# Patient Record
Sex: FEMALE | Race: OTHER | NOT HISPANIC OR LATINO | URBAN - METROPOLITAN AREA
[De-identification: names, ages, dates, MRNs, and addresses within clinical notes are randomized per-mention and may not be internally consistent; named-entity substitution may affect disease eponyms.]

---

## 2017-08-16 ENCOUNTER — INPATIENT (INPATIENT)
Facility: HOSPITAL | Age: 37
LOS: 2 days | Discharge: HOME | DRG: 872 | End: 2017-08-19
Attending: STUDENT IN AN ORGANIZED HEALTH CARE EDUCATION/TRAINING PROGRAM | Admitting: STUDENT IN AN ORGANIZED HEALTH CARE EDUCATION/TRAINING PROGRAM
Payer: COMMERCIAL

## 2017-08-16 VITALS
OXYGEN SATURATION: 98 % | RESPIRATION RATE: 18 BRPM | SYSTOLIC BLOOD PRESSURE: 112 MMHG | TEMPERATURE: 100 F | HEART RATE: 118 BPM | DIASTOLIC BLOOD PRESSURE: 74 MMHG

## 2017-08-16 DIAGNOSIS — R63.8 OTHER SYMPTOMS AND SIGNS CONCERNING FOOD AND FLUID INTAKE: ICD-10-CM

## 2017-08-16 DIAGNOSIS — A41.9 SEPSIS, UNSPECIFIED ORGANISM: ICD-10-CM

## 2017-08-16 DIAGNOSIS — E03.9 HYPOTHYROIDISM, UNSPECIFIED: ICD-10-CM

## 2017-08-16 DIAGNOSIS — Z29.9 ENCOUNTER FOR PROPHYLACTIC MEASURES, UNSPECIFIED: ICD-10-CM

## 2017-08-16 LAB
ALBUMIN SERPL ELPH-MCNC: 3.2 G/DL — LOW (ref 3.4–5)
ALP SERPL-CCNC: 76 U/L — SIGNIFICANT CHANGE UP (ref 40–120)
ALT FLD-CCNC: 22 U/L — SIGNIFICANT CHANGE UP (ref 12–42)
ANION GAP SERPL CALC-SCNC: 10 MMOL/L — SIGNIFICANT CHANGE UP (ref 9–16)
APPEARANCE UR: CLEAR — SIGNIFICANT CHANGE UP
APTT BLD: 36.9 SEC — HIGH (ref 27.5–36.5)
AST SERPL-CCNC: 27 U/L — SIGNIFICANT CHANGE UP (ref 15–37)
BASOPHILS NFR BLD AUTO: 0.3 % — SIGNIFICANT CHANGE UP (ref 0–2)
BILIRUB SERPL-MCNC: 0.4 MG/DL — SIGNIFICANT CHANGE UP (ref 0.2–1.2)
BILIRUB UR-MCNC: NEGATIVE — SIGNIFICANT CHANGE UP
BUN SERPL-MCNC: 14 MG/DL — SIGNIFICANT CHANGE UP (ref 7–23)
CALCIUM SERPL-MCNC: 9.9 MG/DL — SIGNIFICANT CHANGE UP (ref 8.5–10.5)
CHLORIDE SERPL-SCNC: 102 MMOL/L — SIGNIFICANT CHANGE UP (ref 96–108)
CO2 SERPL-SCNC: 24 MMOL/L — SIGNIFICANT CHANGE UP (ref 22–31)
COLOR SPEC: YELLOW — SIGNIFICANT CHANGE UP
CREAT SERPL-MCNC: 1.03 MG/DL — SIGNIFICANT CHANGE UP (ref 0.5–1.3)
DIFF PNL FLD: (no result)
EOSINOPHIL NFR BLD AUTO: 0.1 % — SIGNIFICANT CHANGE UP (ref 0–6)
FLUAV SPEC QL CULT: NEGATIVE — SIGNIFICANT CHANGE UP
FLUBV AG SPEC QL IA: NEGATIVE — SIGNIFICANT CHANGE UP
GLUCOSE SERPL-MCNC: 118 MG/DL — HIGH (ref 70–99)
GLUCOSE UR QL: NEGATIVE — SIGNIFICANT CHANGE UP
HCG UR QL: NEGATIVE — SIGNIFICANT CHANGE UP
HCT VFR BLD CALC: 36.2 % — SIGNIFICANT CHANGE UP (ref 34.5–45)
HGB BLD-MCNC: 12.4 G/DL — SIGNIFICANT CHANGE UP (ref 11.5–15.5)
IMM GRANULOCYTES NFR BLD AUTO: 0.3 % — SIGNIFICANT CHANGE UP (ref 0–1.5)
INR BLD: 1.47 — HIGH (ref 0.88–1.16)
KETONES UR-MCNC: (no result) MG/DL
LACTATE SERPL-SCNC: 1.2 MMOL/L — SIGNIFICANT CHANGE UP (ref 0.4–2)
LEUKOCYTE ESTERASE UR-ACNC: (no result)
LYMPHOCYTES # BLD AUTO: 8.4 % — LOW (ref 13–44)
MCHC RBC-ENTMCNC: 29.1 PG — SIGNIFICANT CHANGE UP (ref 27–34)
MCHC RBC-ENTMCNC: 34.3 G/DL — SIGNIFICANT CHANGE UP (ref 32–36)
MCV RBC AUTO: 85 FL — SIGNIFICANT CHANGE UP (ref 80–100)
MONOCYTES NFR BLD AUTO: 9.7 % — SIGNIFICANT CHANGE UP (ref 2–14)
NEUTROPHILS NFR BLD AUTO: 81.2 % — HIGH (ref 43–77)
NITRITE UR-MCNC: NEGATIVE — SIGNIFICANT CHANGE UP
PCO2 BLDV: 33 MMHG — LOW (ref 41–51)
PH BLDV: 7.52 — HIGH (ref 7.32–7.43)
PH UR: 6 — SIGNIFICANT CHANGE UP (ref 5–8)
PLATELET # BLD AUTO: 278 K/UL — SIGNIFICANT CHANGE UP (ref 150–400)
PO2 BLDV: 32 MMHG — LOW (ref 35–40)
POTASSIUM SERPL-MCNC: 4 MMOL/L — SIGNIFICANT CHANGE UP (ref 3.5–5.3)
POTASSIUM SERPL-SCNC: 4 MMOL/L — SIGNIFICANT CHANGE UP (ref 3.5–5.3)
PROT SERPL-MCNC: 8.1 G/DL — SIGNIFICANT CHANGE UP (ref 6.4–8.2)
PROT UR-MCNC: NEGATIVE MG/DL — SIGNIFICANT CHANGE UP
PROTHROM AB SERPL-ACNC: 16.3 SEC — HIGH (ref 9.8–12.7)
RBC # BLD: 4.26 M/UL — SIGNIFICANT CHANGE UP (ref 3.8–5.2)
RBC # FLD: 13.9 % — SIGNIFICANT CHANGE UP (ref 10.3–16.9)
S PYO AG SPEC QL IA: NEGATIVE — SIGNIFICANT CHANGE UP
SAO2 % BLDV: 69 % — SIGNIFICANT CHANGE UP
SODIUM SERPL-SCNC: 136 MMOL/L — SIGNIFICANT CHANGE UP (ref 132–145)
SP GR SPEC: <=1.005 — SIGNIFICANT CHANGE UP (ref 1–1.03)
UROBILINOGEN FLD QL: 0.2 E.U./DL — SIGNIFICANT CHANGE UP
WBC # BLD: 17.3 K/UL — HIGH (ref 3.8–10.5)
WBC # FLD AUTO: 17.3 K/UL — HIGH (ref 3.8–10.5)

## 2017-08-16 PROCEDURE — 99222 1ST HOSP IP/OBS MODERATE 55: CPT | Mod: GC

## 2017-08-16 PROCEDURE — 74177 CT ABD & PELVIS W/CONTRAST: CPT | Mod: 26

## 2017-08-16 PROCEDURE — 99285 EMERGENCY DEPT VISIT HI MDM: CPT | Mod: 25

## 2017-08-16 PROCEDURE — 71020: CPT | Mod: 26

## 2017-08-16 PROCEDURE — 93010 ELECTROCARDIOGRAM REPORT: CPT | Mod: NC

## 2017-08-16 RX ORDER — ACETAMINOPHEN 500 MG
650 TABLET ORAL EVERY 6 HOURS
Qty: 0 | Refills: 0 | Status: DISCONTINUED | OUTPATIENT
Start: 2017-08-16 | End: 2017-08-19

## 2017-08-16 RX ORDER — CEFTRIAXONE 500 MG/1
1 INJECTION, POWDER, FOR SOLUTION INTRAMUSCULAR; INTRAVENOUS EVERY 24 HOURS
Qty: 0 | Refills: 0 | Status: DISCONTINUED | OUTPATIENT
Start: 2017-08-17 | End: 2017-08-17

## 2017-08-16 RX ORDER — AZITHROMYCIN 500 MG/1
500 TABLET, FILM COATED ORAL ONCE
Qty: 0 | Refills: 0 | Status: COMPLETED | OUTPATIENT
Start: 2017-08-16 | End: 2017-08-16

## 2017-08-16 RX ORDER — SODIUM CHLORIDE 9 MG/ML
1000 INJECTION INTRAMUSCULAR; INTRAVENOUS; SUBCUTANEOUS ONCE
Qty: 0 | Refills: 0 | Status: COMPLETED | OUTPATIENT
Start: 2017-08-16 | End: 2017-08-16

## 2017-08-16 RX ORDER — ONDANSETRON 8 MG/1
8 TABLET, FILM COATED ORAL ONCE
Qty: 0 | Refills: 0 | Status: COMPLETED | OUTPATIENT
Start: 2017-08-16 | End: 2017-08-16

## 2017-08-16 RX ORDER — ACETAMINOPHEN 500 MG
650 TABLET ORAL ONCE
Qty: 0 | Refills: 0 | Status: DISCONTINUED | OUTPATIENT
Start: 2017-08-16 | End: 2017-08-16

## 2017-08-16 RX ORDER — ACETAMINOPHEN 500 MG
650 TABLET ORAL ONCE
Qty: 0 | Refills: 0 | Status: COMPLETED | OUTPATIENT
Start: 2017-08-16 | End: 2017-08-16

## 2017-08-16 RX ORDER — CEFTRIAXONE 500 MG/1
1 INJECTION, POWDER, FOR SOLUTION INTRAMUSCULAR; INTRAVENOUS ONCE
Qty: 0 | Refills: 0 | Status: COMPLETED | OUTPATIENT
Start: 2017-08-16 | End: 2017-08-16

## 2017-08-16 RX ORDER — SODIUM CHLORIDE 9 MG/ML
3 INJECTION INTRAMUSCULAR; INTRAVENOUS; SUBCUTANEOUS ONCE
Qty: 0 | Refills: 0 | Status: COMPLETED | OUTPATIENT
Start: 2017-08-16 | End: 2017-08-16

## 2017-08-16 RX ORDER — IBUPROFEN 200 MG
600 TABLET ORAL ONCE
Qty: 0 | Refills: 0 | Status: COMPLETED | OUTPATIENT
Start: 2017-08-16 | End: 2017-08-16

## 2017-08-16 RX ORDER — SODIUM CHLORIDE 9 MG/ML
500 INJECTION INTRAMUSCULAR; INTRAVENOUS; SUBCUTANEOUS
Qty: 0 | Refills: 0 | Status: COMPLETED | OUTPATIENT
Start: 2017-08-16 | End: 2017-08-16

## 2017-08-16 RX ADMIN — SODIUM CHLORIDE 3 MILLILITER(S): 9 INJECTION INTRAMUSCULAR; INTRAVENOUS; SUBCUTANEOUS at 14:57

## 2017-08-16 RX ADMIN — SODIUM CHLORIDE 2000 MILLILITER(S): 9 INJECTION INTRAMUSCULAR; INTRAVENOUS; SUBCUTANEOUS at 15:23

## 2017-08-16 RX ADMIN — SODIUM CHLORIDE 1000 MILLILITER(S): 9 INJECTION INTRAMUSCULAR; INTRAVENOUS; SUBCUTANEOUS at 15:36

## 2017-08-16 RX ADMIN — Medication 600 MILLIGRAM(S): at 15:00

## 2017-08-16 RX ADMIN — CEFTRIAXONE 100 GRAM(S): 500 INJECTION, POWDER, FOR SOLUTION INTRAMUSCULAR; INTRAVENOUS at 15:00

## 2017-08-16 RX ADMIN — AZITHROMYCIN 255 MILLIGRAM(S): 500 TABLET, FILM COATED ORAL at 16:19

## 2017-08-16 RX ADMIN — Medication 650 MILLIGRAM(S): at 16:59

## 2017-08-16 RX ADMIN — SODIUM CHLORIDE 2000 MILLILITER(S): 9 INJECTION INTRAMUSCULAR; INTRAVENOUS; SUBCUTANEOUS at 16:19

## 2017-08-16 RX ADMIN — SODIUM CHLORIDE 2000 MILLILITER(S): 9 INJECTION INTRAMUSCULAR; INTRAVENOUS; SUBCUTANEOUS at 16:00

## 2017-08-16 RX ADMIN — ONDANSETRON 8 MILLIGRAM(S): 8 TABLET, FILM COATED ORAL at 21:28

## 2017-08-16 RX ADMIN — Medication 600 MILLIGRAM(S): at 20:36

## 2017-08-16 RX ADMIN — Medication 600 MILLIGRAM(S): at 16:20

## 2017-08-16 RX ADMIN — SODIUM CHLORIDE 1000 MILLILITER(S): 9 INJECTION INTRAMUSCULAR; INTRAVENOUS; SUBCUTANEOUS at 20:28

## 2017-08-16 RX ADMIN — SODIUM CHLORIDE 2000 MILLILITER(S): 9 INJECTION INTRAMUSCULAR; INTRAVENOUS; SUBCUTANEOUS at 14:57

## 2017-08-16 NOTE — H&P ADULT - NSHPLABSRESULTS_GEN_ALL_CORE
.  LABS:                         12.4   17.3  )-----------( 278      ( 16 Aug 2017 14:52 )             36.2     08-16    136  |  102  |  14  ----------------------------<  118<H>  4.0   |  24  |  1.03    Ca    9.9      16 Aug 2017 14:52    TPro  8.1  /  Alb  3.2<L>  /  TBili  0.4  /  DBili  x   /  AST  27  /  ALT  22  /  AlkPhos  76  08-16    PT/INR - ( 16 Aug 2017 14:52 )   PT: 16.3 sec;   INR: 1.47          PTT - ( 16 Aug 2017 14:52 )  PTT:36.9 sec  Urinalysis Basic - ( 16 Aug 2017 16:23 )    Color: Yellow / Appearance: Clear / SG: <=1.005 / pH: x  Gluc: x / Ketone: Trace mg/dL  / Bili: NEGATIVE / Urobili: 0.2 E.U./dL   Blood: x / Protein: NEGATIVE mg/dL / Nitrite: NEGATIVE   Leuk Esterase: Small / RBC: 5-10 /HPF / WBC 5-10 /HPF   Sq Epi: x / Non Sq Epi: Many /HPF / Bacteria: Moderate /HPF            Lactate, Blood: 1.2 mmoL/L (08-16 @ 14:52)      RADIOLOGY, EKG & ADDITIONAL TESTS: Reviewed. .  LABS:                         12.4   17.3  )-----------( 278      ( 16 Aug 2017 14:52 )             36.2     08-16    136  |  102  |  14  ----------------------------<  118<H>  4.0   |  24  |  1.03    Ca    9.9      16 Aug 2017 14:52    TPro  8.1  /  Alb  3.2<L>  /  TBili  0.4  /  DBili  x   /  AST  27  /  ALT  22  /  AlkPhos  76  08-16    PT/INR - ( 16 Aug 2017 14:52 )   PT: 16.3 sec;   INR: 1.47          PTT - ( 16 Aug 2017 14:52 )  PTT:36.9 sec  Urinalysis Basic - ( 16 Aug 2017 16:23 )    Color: Yellow / Appearance: Clear / SG: <=1.005 / pH: x  Gluc: x / Ketone: Trace mg/dL  / Bili: NEGATIVE / Urobili: 0.2 E.U./dL   Blood: x / Protein: NEGATIVE mg/dL / Nitrite: NEGATIVE   Leuk Esterase: Small / RBC: 5-10 /HPF / WBC 5-10 /HPF   Sq Epi: x / Non Sq Epi: Many /HPF / Bacteria: Moderate /HPF            Lactate, Blood: 1.2 mmoL/L (08-16 @ 14:52)      RADIOLOGY, EKG & ADDITIONAL TESTS: Reviewed.  CT scan - Wedge-shaped hypodensities extending to the cortex of the bilateral   kidneys, findings consistent with acute pyelonephritis.

## 2017-08-16 NOTE — H&P ADULT - ATTENDING COMMENTS
Pt seen and examined at bedside     Agree with HPI, ROS as above. Pt feels improved, is able to drink and eat.     VS, Labs, FH, SH, allergies, medications, imaging reviewed. Agree with physical exam as above.    A/P: 36 Yo F with Pmhx of hypothyroidism presents with CC of fever and back pain x 2 days, had CT scan which showed, pyelonephritis, c/w clinical exam, admitted for IV abx.    **Sepsis 2/2 pyelonephritis  -pt w/ uncomplicated pyelonephritis, as evidenced on radiography and c/w physical exam/history  -C/w ceftriaxone  -Pt can likely be transitioned to PO in AM   -F/u Ucx, Blood cultures    Plan otherwise as outlined above....

## 2017-08-16 NOTE — H&P ADULT - PROBLEM SELECTOR PLAN 5
Regular diet, replete lytes with goal K>4 and Mg>2  Full code  Dispo - pending improvement in PO intake

## 2017-08-16 NOTE — ED ADULT NURSE NOTE - CHIEF COMPLAINT QUOTE
Patient to ED with complaint of Fever X 2 days, generalized malaise X 2 weeks.  Patient on regiment of doxycycline for atypical pneumonia.  States new onset of lower back and head pain with fever.  Patient tachycardic in triage.  Code Sepsis called

## 2017-08-16 NOTE — ED PROVIDER NOTE - MEDICAL DECISION MAKING DETAILS
patient with generalized body aches, fever, chills, rigors, BL flank pain, cough on doxy x 2 doses for suspected pneumonia with low grade fever in ED, tachycardic, will do CXR, check labs, give IV hydration.

## 2017-08-16 NOTE — H&P ADULT - ASSESSMENT
38 Yo F with Pmhx of hypothyroidism presents with CC of fever and back pain x 2 days, had CT scan which showed, pyelonephritis, c/w clinical exam, admitted for IV abx, as was unable to take po abx-

## 2017-08-16 NOTE — ED PROVIDER NOTE - ENMT, MLM
Airway patent, Nasal mucosa clear. Mouth with normal mucosa. Throat has no vesicles, no oropharyngeal exudates and uvula is midline

## 2017-08-16 NOTE — ED PROVIDER NOTE - OBJECTIVE STATEMENT
PMHx hypothyroidism presenetes with fever Tmax 102.6F, throat pain, congestion, bodyaches, mid to lower back pain, and dry cough x 10 days. patient was seen by her PCP yesturday that started her on doxycycline for suspected pneumonia. denies productive cough, chest pain, SOB, dysuria, nausea, vomiting. admits to anorexia. last took tylenol 40 minutes prior to arrive. patient return from a trip to [facility] in early July and has been well until 10 days ago PMHx hypothyroidism presenetes with fever Tmax 102.6F, throat pain, congestion, bodyaches, and dry cough x 10 days. mid/lower back pain started yesturday. patient was seen by her PCP yesturday that started her on doxycycline for suspected pneumonia. denies productive cough, chest pain, SOB, dysuria, nausea, vomiting. admits to anorexia. last took tylenol 40 minutes prior to arrive. patient return from a trip to [facility] in early July and has been well until 10 days ago

## 2017-08-16 NOTE — H&P ADULT - NSHPPHYSICALEXAM_GEN_ALL_CORE
.  VITAL SIGNS:  T(C): 37 (08-16-17 @ 22:38), Max: 38.2 (08-16-17 @ 15:38)  T(F): 98.6 (08-16-17 @ 22:38), Max: 100.7 (08-16-17 @ 15:38)  HR: 74 (08-16-17 @ 22:38) (69 - 118)  BP: 108/69 (08-16-17 @ 22:38) (108/69 - 112/74)  BP(mean): --  RR: 17 (08-16-17 @ 22:38) (17 - 18)  SpO2: 97% (08-16-17 @ 22:38) (97% - 99%)  Wt(kg): --    PHYSICAL EXAM:    Constitutional: WDWN resting comfortably in bed; NAD  Head: NC/AT  Eyes: PERRL, EOMI, clear conjunctiva  ENT: no nasal discharge; uvula midline, no oropharyngeal erythema or exudates; MMM  Neck: supple; no JVD or thyromegaly  Respiratory: CTA B/L; no W/R/R, no retractions  Cardiac: +S1/S2; RRR; no M/R/G; PMI non-displaced  Gastrointestinal: soft, NT/ND; no rebound or guarding; +BSx4  Genitourinary: normal external genitalia  Back: spine midline, no bony tenderness or step-offs; no CVAT B/L  Extremities: WWP, no clubbing or cyanosis; no peripheral edema  Musculoskeletal: NROM x4; no joint swelling, tenderness or erythema  Vascular: 2+ radial, femoral, DP/PT pulses B/L  Dermatologic: skin warm, dry and intact; no rashes, wounds, or scars  Lymphatic: no submandibular or cervical LAD  Neurologic: AAOx3; CNII-XII grossly intact; no focal deficits  Psychiatric: affect and characteristics of appearance, verbalizations, behaviors are appropriate .  VITAL SIGNS:  T(C): 37 (08-16-17 @ 22:38), Max: 38.2 (08-16-17 @ 15:38)  T(F): 98.6 (08-16-17 @ 22:38), Max: 100.7 (08-16-17 @ 15:38)  HR: 74 (08-16-17 @ 22:38) (69 - 118)  BP: 108/69 (08-16-17 @ 22:38) (108/69 - 112/74)  BP(mean): --  RR: 17 (08-16-17 @ 22:38) (17 - 18)  SpO2: 97% (08-16-17 @ 22:38) (97% - 99%)  Wt(kg): --    PHYSICAL EXAM:    Constitutional: WDWN resting comfortably in bed; NAD  Head: NC/AT  Eyes: PERRL, EOMI, Clear conjunctiva  ENT: no nasal discharge; uvula midline, no oropharyngeal erythema or exudates; MMM  Neck: supple; no JVD or thyromegaly  Respiratory: CTA B/L; no W/R/R, no retractions  Cardiac: +S1/S2; RRR; no M/R/G; PMI non-displaced  Gastrointestinal: soft, NT/ND; no rebound or guarding; +BSx4  Genitourinary: normal external genitalia  Back: + CVAT B/L (L>R), no spinal tenderness  Extremities: WWP, no clubbing or cyanosis; no peripheral edema  Musculoskeletal: NROM x4; no joint swelling, tenderness or erythema  Vascular: 2+ radial, femoral, DP/PT pulses B/L  Dermatologic: skin warm, dry and intact; no rashes, wounds, or scars  Lymphatic: no submandibular or cervical LAD  Neurologic: AAOx3; CNII-XII grossly intact; no focal deficits  Psychiatric: affect and characteristics of appearance, verbalizations, behaviors are appropriate

## 2017-08-16 NOTE — ED ADULT NURSE REASSESSMENT NOTE - NS ED NURSE REASSESS COMMENT FT1
Transfer of care received from KHOA Flores. Pt is laying in bed comfortably, in NAD. Vitals WNL. Pt awaiting admit to [facility]. Will continue to monitor.

## 2017-08-16 NOTE — H&P ADULT - HISTORY OF PRESENT ILLNESS
PMHx hypothyroidism presenetes with fever Tmax 102.6F, throat pain, congestion, bodyaches, and dry cough x 10 days. mid/lower back pain started yesturday. patient was seen by her PCP yesturday that started her on doxycycline for suspected pneumonia. denies productive cough, chest pain, SOB, dysuria, nausea, vomiting. admits to anorexia. last took tylenol 40 minutes prior to arrive. patient return from a trip to [facility] in early July and has been well until 10 days ag      In ED vitals were   Vital Signs Last 24 Hrs  T(C): 37 (16 Aug 2017 22:38), Max: 38.2 (16 Aug 2017 15:38)  T(F): 98.6 (16 Aug 2017 22:38), Max: 100.7 (16 Aug 2017 15:38)  HR: 74 (16 Aug 2017 22:38) (69 - 118)  BP: 108/69 (16 Aug 2017 22:38) (108/69 - 112/74)  BP(mean): --  RR: 17 (16 Aug 2017 22:38) (17 - 18)  SpO2: 97% (16 Aug 2017 22:38) (97% - 99%)    She received tylenol, motrin, ceftriaxone, azithromycin, 3 L NS bolus. She also had CT scan. 38 Yo F with Pmhx of hypothyroidism presents with CC of fever and back pain x 2 days. She started experiencing sore throat,  chills since last week, took Robitussin, symptoms improved. She started experiencing back pain, reexperienced fever, gradually got worse, took Tylenol tablets every 4-6 h w/o response. She went to her PCP on Monday, who thought she might have PNA(No c-xray) prescribes abx - doxycycline, her fever got worse, Tmax ~ 102 at home. She came to ED. Her back pain is bilateral, 8/10 when worse, gets better with tylenol, w/o radiation. Also + chills, rigors, body ache. Denies CP, SOB, belly, pain, n/v/d, dysuria, burning, frequency, previous episode of pyelonephritis, uti, hx of STD, vaginal discharge.  Of note, pt. recently came from a trip to [facility], [place] and [facility], where she was feeling well. She also changed her IUD 3 months ago. NKDA. Social drinker, denies drinking or IVDA. Sexually active w one partner for last 3 months.   In ED vitals were   Vital Signs Last 24 Hrs  T(C): 37 (16 Aug 2017 22:38), Max: 38.2 (16 Aug 2017 15:38)  T(F): 98.6 (16 Aug 2017 22:38), Max: 100.7 (16 Aug 2017 15:38)  HR: 74 (16 Aug 2017 22:38) (69 - 118)  BP: 108/69 (16 Aug 2017 22:38) (108/69 - 112/74)  BP(mean): --  RR: 17 (16 Aug 2017 22:38) (17 - 18)  SpO2: 97% (16 Aug 2017 22:38) (97% - 99%)    She received tylenol, motrin, ceftriaxone, azithromycin, 3 L NS bolus. She also had CT scan.

## 2017-08-16 NOTE — ED PROVIDER NOTE - ATTENDING CONTRIBUTION TO CARE
Pt is a 38yo F with a h/o hypothyroidism who p/w fever, sore throat, congestion, and body aches for the past 10 days.  Also reports dry cough.  Yesterday she developed mid-low back pain - bilateral.  Seen by PCP yesterday and started on Doxycycline for a suspected PNA.  +Nausea and anorexia.   ROS: Denies any chest pain, SOB, dysuria, vomiting.   PE - agree with PA exam as above.   A/P - Fever w/u.  Labs, CXR, UA.  CTAP performed given concerning hx of back pain - shows b/l pyelonephritis.  Abx given.  Anti-emetic given with no resolution of nausea.  Pt denies any improvement of sxs so will admit for complicated/bilateral pyelonephritis and nausea.  Pt will need IV hydration, IV antibiotics, and IV anti-emetics prn.

## 2017-08-16 NOTE — H&P ADULT - PROBLEM SELECTOR PLAN 3
low risk SCD usure reason, no on AC, not on any supplement, reports dec in intake of green vegetables  - Outpatient eval for Vit K defn unsure reason, no on AC, not on any supplement, reports dec in intake of green vegetables  - Outpatient eval for Vit K defn

## 2017-08-16 NOTE — ED ADULT TRIAGE NOTE - CHIEF COMPLAINT QUOTE
Patient to ED with complaint of Fever X 2 days, generalized malaise X 2 weeks.  Patient on regiment of doxycycline for atypical pneumonia.  States new onset of lower back and head pain with fever.  Patient tachycardic in triage Patient to ED with complaint of Fever X 2 days, generalized malaise X 2 weeks.  Patient on regiment of doxycycline for atypical pneumonia.  States new onset of lower back and head pain with fever.  Patient tachycardic in triage.  Code Sepsis called

## 2017-08-16 NOTE — H&P ADULT - PROBLEM SELECTOR PLAN 4
Regular diet, replete lytes with goal K>4 and Mg>2  Full code  Dispo - pending improvement in PO intake low risk SCD

## 2017-08-16 NOTE — H&P ADULT - PROBLEM SELECTOR PLAN 1
fever, + white count, left shift, + SIRS with kidney as source on CT scan, No abscess, no obstruction on kidney - uncomplicated(no other risk factor), s/p ceftriaxone and azithro in LHGV.  - c/w IV ceftriaxone, can transition to PO cipro/bactrim on discharge -?tomorrow  - f/u BCX and Urine cx  - Po tylenol prn fever and pain fever, + white count, left shift, + SIRS with kidney as source on CT scan, No abscess, no obstruction on kidney - uncomplicated(no other risk factor), interestingly UA weekly +, also had epithelial cell.  s/p ceftriaxone and azithro in LHGV. I  - c/w IV ceftriaxone, can transition to PO cipro/bactrim on discharge -?tomorrow  - f/u BCX and Urine cx  - Po tylenol prn fever and pain fever, + white count, left shift, + SIRS with kidney as source on CT scan, No abscess, no obstruction on kidney - uncomplicated(no other risk factor), interestingly UA weekly +, also had epithelial cell.  s/p ceftriaxone and azithro in LHGV.  - c/w IV ceftriaxone, can transition to PO cipro/bactrim on discharge -?tomorrow  - f/u BCX and Urine cx  - Po tylenol prn fever and pain

## 2017-08-17 DIAGNOSIS — R79.1 ABNORMAL COAGULATION PROFILE: ICD-10-CM

## 2017-08-17 DIAGNOSIS — N12 TUBULO-INTERSTITIAL NEPHRITIS, NOT SPECIFIED AS ACUTE OR CHRONIC: ICD-10-CM

## 2017-08-17 DIAGNOSIS — E88.09 OTHER DISORDERS OF PLASMA-PROTEIN METABOLISM, NOT ELSEWHERE CLASSIFIED: ICD-10-CM

## 2017-08-17 LAB
-  CANDIDA ALBICANS: SIGNIFICANT CHANGE UP
-  CANDIDA GLABRATA: SIGNIFICANT CHANGE UP
-  CANDIDA KRUSEI: SIGNIFICANT CHANGE UP
-  CANDIDA PARAPSILOSIS: SIGNIFICANT CHANGE UP
-  CANDIDA TROPICALIS: SIGNIFICANT CHANGE UP
-  COAGULASE NEGATIVE STAPHYLOCOCCUS: SIGNIFICANT CHANGE UP
-  K. PNEUMONIAE GROUP: SIGNIFICANT CHANGE UP
-  KPC RESISTANCE GENE: SIGNIFICANT CHANGE UP
-  STREPTOCOCCUS SP. (NOT GRP A, B OR S PNEUMONIAE): SIGNIFICANT CHANGE UP
A BAUMANNII DNA SPEC QL NAA+PROBE: SIGNIFICANT CHANGE UP
ALBUMIN SERPL ELPH-MCNC: 2.9 G/DL — LOW (ref 3.3–5)
ALP SERPL-CCNC: 79 U/L — SIGNIFICANT CHANGE UP (ref 40–120)
ALT FLD-CCNC: 14 U/L — SIGNIFICANT CHANGE UP (ref 10–45)
ANION GAP SERPL CALC-SCNC: 13 MMOL/L — SIGNIFICANT CHANGE UP (ref 5–17)
APPEARANCE UR: CLEAR — SIGNIFICANT CHANGE UP
APTT BLD: 33 SEC — SIGNIFICANT CHANGE UP (ref 27.5–37.4)
AST SERPL-CCNC: 17 U/L — SIGNIFICANT CHANGE UP (ref 10–40)
BACTERIA # UR AUTO: PRESENT /HPF
BASOPHILS NFR BLD AUTO: 0.1 % — SIGNIFICANT CHANGE UP (ref 0–2)
BILIRUB SERPL-MCNC: 0.2 MG/DL — SIGNIFICANT CHANGE UP (ref 0.2–1.2)
BILIRUB UR-MCNC: NEGATIVE — SIGNIFICANT CHANGE UP
BUN SERPL-MCNC: 9 MG/DL — SIGNIFICANT CHANGE UP (ref 7–23)
CALCIUM SERPL-MCNC: 8.6 MG/DL — SIGNIFICANT CHANGE UP (ref 8.4–10.5)
CHLORIDE SERPL-SCNC: 104 MMOL/L — SIGNIFICANT CHANGE UP (ref 96–108)
CO2 SERPL-SCNC: 22 MMOL/L — SIGNIFICANT CHANGE UP (ref 22–31)
COLOR SPEC: YELLOW — SIGNIFICANT CHANGE UP
CREAT SERPL-MCNC: 0.7 MG/DL — SIGNIFICANT CHANGE UP (ref 0.5–1.3)
DIFF PNL FLD: (no result)
E CLOAC COMP DNA BLD POS QL NAA+PROBE: SIGNIFICANT CHANGE UP
E COLI DNA BLD POS QL NAA+NON-PROBE: SIGNIFICANT CHANGE UP
ENTEROCOC DNA BLD POS QL NAA+NON-PROBE: SIGNIFICANT CHANGE UP
ENTEROCOC DNA BLD POS QL NAA+NON-PROBE: SIGNIFICANT CHANGE UP
EOSINOPHIL NFR BLD AUTO: 0.2 % — SIGNIFICANT CHANGE UP (ref 0–6)
EPI CELLS # UR: (no result) /HPF
GLUCOSE SERPL-MCNC: 97 MG/DL — SIGNIFICANT CHANGE UP (ref 70–99)
GLUCOSE UR QL: NEGATIVE — SIGNIFICANT CHANGE UP
GP B STREP DNA BLD POS QL NAA+NON-PROBE: SIGNIFICANT CHANGE UP
GRAM STN FLD: SIGNIFICANT CHANGE UP
HAEM INFLU DNA BLD POS QL NAA+NON-PROBE: SIGNIFICANT CHANGE UP
HBA1C BLD-MCNC: 5.7 % — HIGH (ref 4–5.6)
HCT VFR BLD CALC: 31.7 % — LOW (ref 34.5–45)
HGB BLD-MCNC: 10.6 G/DL — LOW (ref 11.5–15.5)
INR BLD: 1.29 — HIGH (ref 0.88–1.16)
K OXYTOCA DNA BLD POS QL NAA+NON-PROBE: SIGNIFICANT CHANGE UP
KETONES UR-MCNC: NEGATIVE — SIGNIFICANT CHANGE UP
L MONOCYTOG DNA BLD POS QL NAA+NON-PROBE: SIGNIFICANT CHANGE UP
LACTATE SERPL-SCNC: 0.7 MMOL/L — SIGNIFICANT CHANGE UP (ref 0.5–2)
LEUKOCYTE ESTERASE UR-ACNC: (no result)
LYMPHOCYTES # BLD AUTO: 9.6 % — LOW (ref 13–44)
MAGNESIUM SERPL-MCNC: 2.1 MG/DL — SIGNIFICANT CHANGE UP (ref 1.6–2.6)
MCHC RBC-ENTMCNC: 28.4 PG — SIGNIFICANT CHANGE UP (ref 27–34)
MCHC RBC-ENTMCNC: 33.4 G/DL — SIGNIFICANT CHANGE UP (ref 32–36)
MCV RBC AUTO: 85 FL — SIGNIFICANT CHANGE UP (ref 80–100)
METHOD TYPE: SIGNIFICANT CHANGE UP
MONOCYTES NFR BLD AUTO: 10.9 % — SIGNIFICANT CHANGE UP (ref 2–14)
MRSA SPEC QL CULT: SIGNIFICANT CHANGE UP
MSSA DNA SPEC QL NAA+PROBE: SIGNIFICANT CHANGE UP
N MEN ISLT CULT: SIGNIFICANT CHANGE UP
NEUTROPHILS NFR BLD AUTO: 79.2 % — HIGH (ref 43–77)
NITRITE UR-MCNC: NEGATIVE — SIGNIFICANT CHANGE UP
P AERUGINOSA DNA BLD POS NAA+NON-PROBE: SIGNIFICANT CHANGE UP
PH UR: 7 — SIGNIFICANT CHANGE UP (ref 5–8)
PLATELET # BLD AUTO: 284 K/UL — SIGNIFICANT CHANGE UP (ref 150–400)
POTASSIUM SERPL-MCNC: 4.2 MMOL/L — SIGNIFICANT CHANGE UP (ref 3.5–5.3)
POTASSIUM SERPL-SCNC: 4.2 MMOL/L — SIGNIFICANT CHANGE UP (ref 3.5–5.3)
PROT SERPL-MCNC: 6.5 G/DL — SIGNIFICANT CHANGE UP (ref 6–8.3)
PROT UR-MCNC: NEGATIVE MG/DL — SIGNIFICANT CHANGE UP
PROTEUS SP DNA BLD POS QL NAA+NON-PROBE: SIGNIFICANT CHANGE UP
PROTHROM AB SERPL-ACNC: 14.4 SEC — HIGH (ref 9.8–12.7)
RBC # BLD: 3.73 M/UL — LOW (ref 3.8–5.2)
RBC # FLD: 14.5 % — SIGNIFICANT CHANGE UP (ref 10.3–16.9)
RBC CASTS # UR COMP ASSIST: > 10 /HPF
S MARCESCENS DNA BLD POS NAA+NON-PROBE: SIGNIFICANT CHANGE UP
S PNEUM DNA BLD POS QL NAA+NON-PROBE: SIGNIFICANT CHANGE UP
S PYO DNA BLD POS QL NAA+NON-PROBE: SIGNIFICANT CHANGE UP
SODIUM SERPL-SCNC: 139 MMOL/L — SIGNIFICANT CHANGE UP (ref 135–145)
SP GR SPEC: <=1.005 — SIGNIFICANT CHANGE UP (ref 1–1.03)
SPECIMEN SOURCE: SIGNIFICANT CHANGE UP
UROBILINOGEN FLD QL: 0.2 E.U./DL — SIGNIFICANT CHANGE UP
WBC # BLD: 14.7 K/UL — HIGH (ref 3.8–10.5)
WBC # FLD AUTO: 14.7 K/UL — HIGH (ref 3.8–10.5)
WBC UR QL: > 10 /HPF

## 2017-08-17 PROCEDURE — 71010: CPT | Mod: 26

## 2017-08-17 PROCEDURE — 99233 SBSQ HOSP IP/OBS HIGH 50: CPT | Mod: GC

## 2017-08-17 RX ORDER — LEVOTHYROXINE SODIUM 125 MCG
75 TABLET ORAL DAILY
Qty: 0 | Refills: 0 | Status: DISCONTINUED | OUTPATIENT
Start: 2017-08-17 | End: 2017-08-19

## 2017-08-17 RX ORDER — PIPERACILLIN AND TAZOBACTAM 4; .5 G/20ML; G/20ML
3.38 INJECTION, POWDER, LYOPHILIZED, FOR SOLUTION INTRAVENOUS EVERY 6 HOURS
Qty: 0 | Refills: 0 | Status: DISCONTINUED | OUTPATIENT
Start: 2017-08-17 | End: 2017-08-19

## 2017-08-17 RX ORDER — LEVOTHYROXINE SODIUM 125 MCG
1 TABLET ORAL
Qty: 0 | Refills: 0 | COMMUNITY

## 2017-08-17 RX ORDER — SODIUM CHLORIDE 9 MG/ML
1000 INJECTION INTRAMUSCULAR; INTRAVENOUS; SUBCUTANEOUS
Qty: 0 | Refills: 0 | Status: DISCONTINUED | OUTPATIENT
Start: 2017-08-17 | End: 2017-08-18

## 2017-08-17 RX ADMIN — Medication 650 MILLIGRAM(S): at 12:42

## 2017-08-17 RX ADMIN — Medication 650 MILLIGRAM(S): at 19:52

## 2017-08-17 RX ADMIN — Medication 200 MILLIGRAM(S): at 22:22

## 2017-08-17 RX ADMIN — CEFTRIAXONE 100 GRAM(S): 500 INJECTION, POWDER, FOR SOLUTION INTRAMUSCULAR; INTRAVENOUS at 14:16

## 2017-08-17 RX ADMIN — PIPERACILLIN AND TAZOBACTAM 200 GRAM(S): 4; .5 INJECTION, POWDER, LYOPHILIZED, FOR SOLUTION INTRAVENOUS at 20:30

## 2017-08-17 RX ADMIN — SODIUM CHLORIDE 80 MILLILITER(S): 9 INJECTION INTRAMUSCULAR; INTRAVENOUS; SUBCUTANEOUS at 16:03

## 2017-08-17 RX ADMIN — Medication 650 MILLIGRAM(S): at 05:04

## 2017-08-17 RX ADMIN — Medication 75 MICROGRAM(S): at 06:41

## 2017-08-17 NOTE — PROGRESS NOTE ADULT - PROBLEM SELECTOR PLAN 3
- INR 1.47 upon admission. Likely due to sepsis vs previous URI  - Unlikely vitamin K deficiency, patient reports eating one salad per day  - Downtrended to 1.29 this morning. Will monitor and can follow up outpatient

## 2017-08-17 NOTE — CHART NOTE - NSCHARTNOTEFT_GEN_A_CORE
Called to bedside by nurse due to reports of patient being tachypneic. On arrival, patient looked more diaphoretic than she had throughout day, with mildly increased work of breathing and RR 22. Tylenol given due to fever 38.9, P93, 104/66 (baseline for patient), 95% sat RA. Discussed with senior resident, antibiotic coverage broadened to Zosyn, stat CXR ordered. Ordered lactate, will trend if necessary and consider fluids if patient CXR does not demonstrate fluid overload in setting of sepsis. Called to bedside by nurse due to reports of patient being tachypneic. On arrival, patient looked more diaphoretic than she had throughout day, with mildly increased work of breathing and RR 22. Tylenol given due to fever 38.9, P93, 104/66 (baseline for patient), 95% sat RA. Pulmonary exam remarkable for b/l decreased breath sounds at bases. Cardiac exam S1 S2 + , tachycardic, no murmurs, rubs, or gallops appreciated. Discussed with senior resident, antibiotic coverage broadened to Zosyn, stat CXR ordered. Ordered lactate, will trend if necessary and consider fluids if patient CXR does not demonstrate fluid overload in setting of sepsis.

## 2017-08-17 NOTE — DISCHARGE NOTE ADULT - CARE PLAN
Principal Discharge DX:	Pyelonephritis  Secondary Diagnosis:	Hypothyroidism, unspecified type  Secondary Diagnosis:	Elevated INR Principal Discharge DX:	Sepsis due to Escherichia coli  Goal:	Clearance of infection  Instructions for follow-up, activity and diet:	You were found to have bilateral pyelonephritis (kidney infection) on this admission, likely due to a UTI that migrated to your kidneys. E. Coli was found in your bloodstream, likely from the pyelonephritis. You were given antibiotics throughout your admission and discharged home with a prescription, it is important to complete your treatment to ensure that the infection is cleared. If you experience recurrence of fever, flank or back pain, or notice blood in your urine, please proceed to the nearest emergency department.  Secondary Diagnosis:	Hypothyroidism, unspecified type  Goal:	Euthyroid status  Instructions for follow-up, activity and diet:	You have hypothyroidism. Please continue to take your synthroid home medication to keep your thyroid hormone within the normal range.  Secondary Diagnosis:	Elevated INR  Goal:	Outpatient workup  Instructions for follow-up, activity and diet:	You were found to have an elevated marker for anticoagulation on this visit, as your INR high upon admission. This can be due to the infection that you experienced, or can be due to an unknown etiology. It is recommended to have your PT/PTT/INR drawn when you schedule your next appointment with your primary care physician, Dr. Melgar.  Secondary Diagnosis:	Hypoalbuminemia  Goal:	Improve nutritional status  Instructions for follow-up, activity and diet:	You were found to have a decreased albumin level on your admission. This can be due to poor nutritional intake, likely due to your pyelonephritis and sepsis. Please be sure to eat full, balanced meals and follow up with your primary care physician, Dr. Melgar.  Secondary Diagnosis:	Pyelonephritis  Goal:	Understand etiology of infection  Instructions for follow-up, activity and diet:	You were diagnosed with bilateral pyelonephritis on admission. Due to your family history of kidney infections, it may be possible that there is an anatomic abnormality of your urinary tract/bladder that predisposes you to infections. If you continue to have recurrent UTIs or pyelonephritis, you can consider having the cause of these infections worked up with a urologist. Principal Discharge DX:	Sepsis due to Escherichia coli  Goal:	Clearance of infection  Instructions for follow-up, activity and diet:	You were found to have bilateral pyelonephritis (kidney infection) on this admission, likely due to a UTI that migrated to your kidneys. E. Coli was found in your bloodstream, likely from the pyelonephritis. You were given antibiotics throughout your admission and discharged home with a prescription, it is important to complete your treatment to ensure that the infection is cleared. If you experience recurrence of fever, flank or back pain, or notice blood in your urine, please proceed to the nearest emergency department.  -You are being discharged on ciprofloxacin 500 mg twice a day for 11 days.   -Please follow up with your PMD in 1 week.  Secondary Diagnosis:	Hypothyroidism, unspecified type  Goal:	Euthyroid status  Instructions for follow-up, activity and diet:	You have hypothyroidism. Please continue to take your synthroid home medication to keep your thyroid hormone within the normal range.  -Please follow up with your PMD in 1 week.  Secondary Diagnosis:	Elevated INR  Goal:	Outpatient workup  Instructions for follow-up, activity and diet:	You were found to have an elevated marker for anticoagulation on this visit, as your INR high upon admission. This can be due to the infection that you experienced, or can be due to an unknown etiology. It is recommended to have your PT/PTT/INR drawn when you schedule your next appointment with your primary care physician, Dr. Melgar.  -Please follow up with your PMD in 1 week.  Secondary Diagnosis:	Hypoalbuminemia  Goal:	Improve nutritional status  Instructions for follow-up, activity and diet:	You were found to have a decreased albumin level on your admission. This can be due to poor nutritional intake, likely due to your pyelonephritis and sepsis. Please be sure to eat full, balanced meals and follow up with your primary care physician, Dr. Melgar.  -Please follow up with your PMD in 1 week.  Secondary Diagnosis:	Pyelonephritis  Goal:	Understand etiology of infection  Instructions for follow-up, activity and diet:	You were diagnosed with bilateral pyelonephritis on admission. Due to your family history of kidney infections, it may be possible that there is an anatomic abnormality of your urinary tract/bladder that predisposes you to infections. If you continue to have recurrent UTIs or pyelonephritis, you can consider having the cause of these infections worked up with a urologist.  -Please follow up with your PMD in 1 week.

## 2017-08-17 NOTE — DISCHARGE NOTE ADULT - PLAN OF CARE
Clearance of infection You were found to have bilateral pyelonephritis (kidney infection) on this admission, likely due to a UTI that migrated to your kidneys. E. Coli was found in your bloodstream, likely from the pyelonephritis. You were given antibiotics throughout your admission and discharged home with a prescription, it is important to complete your treatment to ensure that the infection is cleared. If you experience recurrence of fever, flank or back pain, or notice blood in your urine, please proceed to the nearest emergency department. Euthyroid status You have hypothyroidism. Please continue to take your synthroid home medication to keep your thyroid hormone within the normal range. Outpatient workup You were found to have an elevated marker for anticoagulation on this visit, as your INR high upon admission. This can be due to the infection that you experienced, or can be due to an unknown etiology. It is recommended to have your PT/PTT/INR drawn when you schedule your next appointment with your primary care physician, Dr. Melgar. Improve nutritional status You were found to have a decreased albumin level on your admission. This can be due to poor nutritional intake, likely due to your pyelonephritis and sepsis. Please be sure to eat full, balanced meals and follow up with your primary care physician, Dr. Melgar. Understand etiology of infection You were diagnosed with bilateral pyelonephritis on admission. Due to your family history of kidney infections, it may be possible that there is an anatomic abnormality of your urinary tract/bladder that predisposes you to infections. If you continue to have recurrent UTIs or pyelonephritis, you can consider having the cause of these infections worked up with a urologist. You were found to have bilateral pyelonephritis (kidney infection) on this admission, likely due to a UTI that migrated to your kidneys. E. Coli was found in your bloodstream, likely from the pyelonephritis. You were given antibiotics throughout your admission and discharged home with a prescription, it is important to complete your treatment to ensure that the infection is cleared. If you experience recurrence of fever, flank or back pain, or notice blood in your urine, please proceed to the nearest emergency department.  -You are being discharged on ciprofloxacin 500 mg twice a day for 11 days.   -Please follow up with your PMD in 1 week. You have hypothyroidism. Please continue to take your synthroid home medication to keep your thyroid hormone within the normal range.  -Please follow up with your PMD in 1 week. You were found to have an elevated marker for anticoagulation on this visit, as your INR high upon admission. This can be due to the infection that you experienced, or can be due to an unknown etiology. It is recommended to have your PT/PTT/INR drawn when you schedule your next appointment with your primary care physician, Dr. Melgar.  -Please follow up with your PMD in 1 week. You were found to have a decreased albumin level on your admission. This can be due to poor nutritional intake, likely due to your pyelonephritis and sepsis. Please be sure to eat full, balanced meals and follow up with your primary care physician, Dr. Melgar.  -Please follow up with your PMD in 1 week. You were diagnosed with bilateral pyelonephritis on admission. Due to your family history of kidney infections, it may be possible that there is an anatomic abnormality of your urinary tract/bladder that predisposes you to infections. If you continue to have recurrent UTIs or pyelonephritis, you can consider having the cause of these infections worked up with a urologist.  -Please follow up with your PMD in 1 week.

## 2017-08-17 NOTE — DISCHARGE NOTE ADULT - PATIENT PORTAL LINK FT
“You can access the [facility] Patient Portal, offered by [facility], by registering with the following website: [email or web address]”

## 2017-08-17 NOTE — PROGRESS NOTE ADULT - SUBJECTIVE AND OBJECTIVE BOX
OVERNIGHT EVENTS: Patient febrile to 39.1, decreased with Tylenol    SUBJECTIVE / INTERVAL HPI: 37F PMH hypothyroidism with fever and back pain of approximately 1 week duration. Patient began by experiencing 10 days of cold symptoms including sore throat and cough with clear-yellow phlegm, which improved with Robitussin. She began to have b/l back pain, 8/10, nonradiating, as well as fever which did not respond to q4-6h Tylenol. Fever was subjective and unmeasured Fri. On Mon, pt visited PCP, who prescribed doxycycline (no CXR), fever was 101F Monday, 102F Tues. Patient began to have nausea as well yesterday, came to ED with symptoms of chills, body ache. At no time was patient with dysuria, increased frequency, or urgency, no complaint of vaginal discharge or foul odor.      In ED, febrile 38.2, P 60s-110s, 100s/70s, R 17, sat 97% RA. Received 2.5L NS, tylenol, motrin, ceftriaxone, azithromycin. WBC 17.3, 81% PMNs, PTT 36.9, INR 1.47, albumin 3.2. VBG 7.52, CO2 33, ?bicarb. UA showed many epithelial cells, small LE, neg nitrite, mod blood, 5-10 WBC, 5-10 RBC. Rapid strep and flu negative. CTAP showed wedge shaped bilateral renal hypodensities c/w pyelonephritis.    Patient seen and examined at bedside. Patient feels febrile this morning, denies nausea, vomiting, diarrhea, dysuria, increased frequency, urgency of urination, chest pain, shortness of breath. Admits to dry cough.     VITAL SIGNS:  Vital Signs Last 24 Hrs  T(C): 37.4 (17 Aug 2017 06:43), Max: 39.1 (17 Aug 2017 04:55)  T(F): 99.4 (17 Aug 2017 06:43), Max: 102.3 (17 Aug 2017 04:55)  HR: 92 (17 Aug 2017 05:50) (69 - 118)  BP: 121/85 (17 Aug 2017 05:50) (108/69 - 121/85)  BP(mean): --  RR: 18 (17 Aug 2017 05:50) (17 - 18)  SpO2: 100% (17 Aug 2017 05:50) (97% - 100%)    PHYSICAL EXAM:    General: WDWN, mildly diaphoretic, no acute distress  HEENT: NC/AT, anicteric sclera  Neck: supple, no appreciable lymphadenopathy in anterior cervical lymph nodes or supraclavicular nodes  Cardiovascular: +S1/S2; no MRG appreciated, tachycardic  Respiratory: CTA B/L; no W/R/R, egophony negative  Gastrointestinal: soft, NT/ND; +BSx4  : CVA tenderness, mild, bilateral, worse R than L  Extremities: WWP; no edema or cyanosis  Neurological: AAOx3; no focal deficits    MEDICATIONS:  MEDICATIONS  (STANDING):  cefTRIAXone   IVPB 1 Gram(s) IV Intermittent every 24 hours  levothyroxine 75 MICROGram(s) Oral daily    MEDICATIONS  (PRN):  acetaminophen   Tablet 650 milliGRAM(s) Oral every 6 hours PRN For Temp greater than 38 C (100.4 F)  acetaminophen   Tablet. 650 milliGRAM(s) Oral every 6 hours PRN Moderate Pain (4 - 6)      ALLERGIES:  Allergies    No Known Allergies    Intolerances        LABS:                        12.4   17.3  )-----------( 278      ( 16 Aug 2017 14:52 )             36.2     08-16    136  |  102  |  14  ----------------------------<  118<H>  4.0   |  24  |  1.03    Ca    9.9      16 Aug 2017 14:52    TPro  8.1  /  Alb  3.2<L>  /  TBili  0.4  /  DBili  x   /  AST  27  /  ALT  22  /  AlkPhos  76  08-16    PT/INR - ( 16 Aug 2017 14:52 )   PT: 16.3 sec;   INR: 1.47          PTT - ( 16 Aug 2017 14:52 )  PTT:36.9 sec  Urinalysis Basic - ( 16 Aug 2017 16:23 )    Color: Yellow / Appearance: Clear / SG: <=1.005 / pH: x  Gluc: x / Ketone: Trace mg/dL  / Bili: NEGATIVE / Urobili: 0.2 E.U./dL   Blood: x / Protein: NEGATIVE mg/dL / Nitrite: NEGATIVE   Leuk Esterase: Small / RBC: 5-10 /HPF / WBC 5-10 /HPF   Sq Epi: x / Non Sq Epi: Many /HPF / Bacteria: Moderate /HPF      CAPILLARY BLOOD GLUCOSE          RADIOLOGY & ADDITIONAL TESTS: Reviewed. OVERNIGHT EVENTS: Patient febrile to 39.1, decreased with Tylenol    SUBJECTIVE / INTERVAL HPI: 37F PMH hypothyroidism with fever and back pain of approximately 1 week duration. Patient began by experiencing 10 days of cold symptoms including sore throat and cough with clear-yellow phlegm, which improved with Robitussin. She began to have b/l back pain, 8/10, nonradiating, as well as fever which did not respond to q4-6h Tylenol. Fever was subjective and unmeasured Fri. On Mon, pt visited PCP, who prescribed doxycycline (no CXR), fever was 101F Monday, 102F Tues. Patient began to have nausea as well yesterday, came to ED with symptoms of chills, body ache. At no time was patient with dysuria, increased frequency, or urgency, no complaint of vaginal discharge or foul odor.      In ED, febrile 38.2, P 60s-110s, 100s/70s, R 17, sat 97% RA. Received 2.5L NS, tylenol, motrin, ceftriaxone, azithromycin. WBC 17.3, 81% PMNs, PTT 36.9, INR 1.47, albumin 3.2. VBG 7.52, CO2 33, ?bicarb. UA showed many epithelial cells, small LE, neg nitrite, mod blood, 5-10 WBC, 5-10 RBC. Rapid strep and flu negative. CTAP showed wedge shaped bilateral renal hypodensities c/w pyelonephritis.    Patient seen and examined at bedside. Patient feels febrile this morning, denies nausea, vomiting, diarrhea, dysuria, increased frequency, urgency of urination, chest pain, shortness of breath. Admits to dry cough and generalized headache this morning.     VITAL SIGNS:  Vital Signs Last 24 Hrs  T(C): 37.4 (17 Aug 2017 06:43), Max: 39.1 (17 Aug 2017 04:55)  T(F): 99.4 (17 Aug 2017 06:43), Max: 102.3 (17 Aug 2017 04:55)  HR: 92 (17 Aug 2017 05:50) (69 - 118)  BP: 121/85 (17 Aug 2017 05:50) (108/69 - 121/85)  BP(mean): --  RR: 18 (17 Aug 2017 05:50) (17 - 18)  SpO2: 100% (17 Aug 2017 05:50) (97% - 100%)    PHYSICAL EXAM:    General: WDWN, mildly diaphoretic, no acute distress  HEENT: NC/AT, anicteric sclera, no oropharyngeal exudate, freckling at roof of mouth  Neck: supple, no appreciable lymphadenopathy in anterior cervical lymph nodes or supraclavicular nodes  Cardiovascular: +S1/S2; no MRG appreciated, tachycardic  Respiratory: CTA B/L; no W/R/R, egophony negative  Gastrointestinal: soft, NT/ND; +BSx4  : CVA tenderness, mild, bilateral, worse R than L  Extremities: WWP; no edema or cyanosis  Neurological: AAOx3; no focal deficits    MEDICATIONS:  MEDICATIONS  (STANDING):  cefTRIAXone   IVPB 1 Gram(s) IV Intermittent every 24 hours  levothyroxine 75 MICROGram(s) Oral daily    MEDICATIONS  (PRN):  acetaminophen   Tablet 650 milliGRAM(s) Oral every 6 hours PRN For Temp greater than 38 C (100.4 F)  acetaminophen   Tablet. 650 milliGRAM(s) Oral every 6 hours PRN Moderate Pain (4 - 6)      ALLERGIES:  Allergies    No Known Allergies    Intolerances        LABS:                        12.4   17.3  )-----------( 278      ( 16 Aug 2017 14:52 )             36.2     08-16    136  |  102  |  14  ----------------------------<  118<H>  4.0   |  24  |  1.03    Ca    9.9      16 Aug 2017 14:52    TPro  8.1  /  Alb  3.2<L>  /  TBili  0.4  /  DBili  x   /  AST  27  /  ALT  22  /  AlkPhos  76  08-16    PT/INR - ( 16 Aug 2017 14:52 )   PT: 16.3 sec;   INR: 1.47          PTT - ( 16 Aug 2017 14:52 )  PTT:36.9 sec  Urinalysis Basic - ( 16 Aug 2017 16:23 )    Color: Yellow / Appearance: Clear / SG: <=1.005 / pH: x  Gluc: x / Ketone: Trace mg/dL  / Bili: NEGATIVE / Urobili: 0.2 E.U./dL   Blood: x / Protein: NEGATIVE mg/dL / Nitrite: NEGATIVE   Leuk Esterase: Small / RBC: 5-10 /HPF / WBC 5-10 /HPF   Sq Epi: x / Non Sq Epi: Many /HPF / Bacteria: Moderate /HPF      CAPILLARY BLOOD GLUCOSE          RADIOLOGY & ADDITIONAL TESTS: Reviewed.

## 2017-08-17 NOTE — PROGRESS NOTE ADULT - ASSESSMENT
37F with past medical history hypothyroidism presented with 1 week fever and back pain. Patient came with elevated WBC, was placed on ceftriaxone IV and found by CTAP to have bilateral pyelonephritis. Patient febrile to 39.1 this morning.

## 2017-08-17 NOTE — PROGRESS NOTE ADULT - PROBLEM SELECTOR PLAN 5
- No fluids at present, PO intake okay per pt. Will consider fluids if PO intake decreases or patient shows signs of dehydration on BMP  - Replete electrolytes PRN  - Regular diet  - No SQH (IMPROVE score 0)  - Dispo RMF    Code FULL

## 2017-08-17 NOTE — DISCHARGE NOTE ADULT - HOSPITAL COURSE
36 Yo F with Pmhx of hypothyroidism presents with CC of fever and back pain x 2 days, found to have pyelonephritis, on CT scan. She received IV antibiotics ceftriaxone. She got clinically better. .  She would go home on _________ as culture result showed __________. she was also found to have elevated INR and she would f/u with PCP _________. She is HD stable for discharge 37F with PMH hypothyroidism presented with 6 days fever and bilateral back pain, found to have bilateral pyelonephritis on CT scan. VBG demonstrated pH 7.52, with CO2 33. Lactate was 1.2. Patient was given 2.5L NS bolus in ED. UA was weakly positive for UTI with 5-10 WBC, small leuk esterase, blood and urine cultures sent. Rapid viral panel and flu screening were found to be negative. Patient found to be leukocytotic to 17.3, with elevated INR 1.47, albumin 3.2 (downtrended to 2.9 following fluid administration). She received IV ceftriaxone. Patient continued to experience fevers to 102 throughout her first day of ceftriaxone, and continued to experience mild-moderate dyspnea after her second dose of antibiotics. She was given a stat chest xray and switched to Zosyn. Blood cultures grew E Coli, and patient had clinical improvement following antibiotic escalation. 37F with PMH hypothyroidism presented with 6 days fever and bilateral back pain, found to have bilateral pyelonephritis on CT scan. VBG demonstrated pH 7.52, with CO2 33. Lactate was 1.2. Patient was given 2.5L NS bolus in ED. UA was weakly positive for UTI with 5-10 WBC, small leuk esterase, blood and urine cultures sent. Rapid viral panel and flu screening were found to be negative. Patient found to be leukocytotic to 17.3, with elevated INR 1.47, albumin 3.2 (downtrended to 2.9 following fluid administration). She received IV ceftriaxone. Patient continued to experience fevers to 102 throughout her first day of ceftriaxone, and continued to experience mild-moderate dyspnea after her second dose of antibiotics. She was given a stat chest xray and switched to Zosyn. Blood cultures grew E Coli, and patient had clinical improvement following antibiotic escalation. Blood culture sensitivities show sensitivity to ciprofloxacin.   Patient has been HD stable overnight and is being discharged on PO antibiotics. 37F with PMH hypothyroidism presented with 6 days fever and bilateral back pain, found to have bilateral pyelonephritis on CT scan. VBG demonstrated pH 7.52, with CO2 33. Lactate was 1.2. Patient was given 2.5L NS bolus in ED. UA was weakly positive for UTI with 5-10 WBC, small leuk esterase, blood and urine cultures sent. Rapid viral panel and flu screening were found to be negative. Patient found to be leukocytotic to 17.3, with elevated INR 1.47, albumin 3.2 (downtrended to 2.9 following fluid administration). She received IV ceftriaxone. Patient continued to experience fevers to 102 throughout her first day of ceftriaxone, and continued to experience mild-moderate dyspnea after her second dose of antibiotics. She was given a stat chest xray and switched to Zosyn. Blood cultures grew E Coli, and patient had clinical improvement following antibiotic escalation.    Patient has been HD stable overnight and is being discharged on PO antibiotics. 37F with PMH hypothyroidism presented with 6 days fever and bilateral back pain. in ED UA was weakly positive for UTI with 5-10 WBC, small leuk esterase';  found to have bilateral pyelonephritis on CT scan. Lactate was 1.2. Patient was given 2.5L NS bolus in ED.  blood cxs (+) ecoli.  urine cultures were negative.  Rapid viral panel and flu screening were found to be negative.   Patient found to be leukocytotic to 17.3, with elevated INR 1.47. She received IV ceftriaxone. Patient continued to experience fevers to 102 throughout her first day of ceftriaxone, and continued to experience mild-moderate dyspnea after her second dose of antibiotics. She was given a stat chest xray which revealed a questionable small rt pleural effusion. pt was switched to Zosyn. Blood cultures grew E coli, and patient had clinical improvement by hosp day 3. Patient has been HD stable , with resolution of back pain, afebrile x 24hrs and tolerating POs.  discharged on PO antibiotics.

## 2017-08-17 NOTE — PROGRESS NOTE ADULT - ATTENDING COMMENTS
pt noted to have GNR (+) blood cxs  c/w ceftriaxone  unfortunately it appears urine culture was NOT collected /sent in ED  will send ucx now  f/u speciation of blood cx  rpt blood cxs

## 2017-08-17 NOTE — PROGRESS NOTE ADULT - PROBLEM SELECTOR PLAN 1
- Sepsis likely pyelonephritis due to CTAP findings wedge shaped hypodensities, bilateral kidneys  - WBC 17.3 on admission, downtrended to 14.7 this morning. Trend CBC  - Continue IV ceftriaxone 1g qd, will receive dose this afternoon and consider PO ciprofloxacin 500mg BID x 7d course following IV abx  - Lactate WNL  - UA weakly positive, however, squamous epithelial cells present  - Febrile to 39.1 this morning, Tylenol PRN. Currently 37.1 - Sepsis likely pyelonephritis due to CTAP findings wedge shaped hypodensities, bilateral kidneys  - WBC 17.3 on admission, downtrended to 14.7 this morning. Trend CBC  - Continue IV ceftriaxone 1g qd, will receive dose this afternoon    - Lactate WNL  - UA weakly positive, however, squamous epithelial cells present  - Febrile to 39.1 this morning, Tylenol PRN. Currently 37.1  -f/u ucx

## 2017-08-17 NOTE — DISCHARGE NOTE ADULT - MEDICATION SUMMARY - MEDICATIONS TO TAKE
I will START or STAY ON the medications listed below when I get home from the hospital:    ciprofloxacin 500 mg oral tablet  -- 1 tab(s) by mouth every 12 hours  -- Indication: For Pyelonephritis    Synthroid 75 mcg (0.075 mg) oral tablet  -- 1 tab(s) by mouth once a day  -- Indication: For Hypothyroidism I will START or STAY ON the medications listed below when I get home from the hospital:    levoFLOXacin 750 mg oral tablet  -- 1 tab(s) by mouth every 24 hours  -- Avoid prolonged or excessive exposure to direct and/or artificial sunlight while taking this medication.  Do not take dairy products, antacids, or iron preparations within one hour of this medication.  Finish all this medication unless otherwise directed by prescriber.  May cause drowsiness or dizziness.  Medication should be taken with plenty of water.    -- Indication: For Ecoli infection     Synthroid 75 mcg (0.075 mg) oral tablet  -- 1 tab(s) by mouth once a day  -- Indication: For Hypothyroidism

## 2017-08-18 DIAGNOSIS — A41.51 SEPSIS DUE TO ESCHERICHIA COLI [E. COLI]: ICD-10-CM

## 2017-08-18 DIAGNOSIS — J90 PLEURAL EFFUSION, NOT ELSEWHERE CLASSIFIED: ICD-10-CM

## 2017-08-18 LAB
ANION GAP SERPL CALC-SCNC: 12 MMOL/L — SIGNIFICANT CHANGE UP (ref 5–17)
APTT BLD: 29.8 SEC — SIGNIFICANT CHANGE UP (ref 27.5–37.4)
BUN SERPL-MCNC: 8 MG/DL — SIGNIFICANT CHANGE UP (ref 7–23)
CALCIUM SERPL-MCNC: 8.6 MG/DL — SIGNIFICANT CHANGE UP (ref 8.4–10.5)
CHLORIDE SERPL-SCNC: 105 MMOL/L — SIGNIFICANT CHANGE UP (ref 96–108)
CO2 SERPL-SCNC: 24 MMOL/L — SIGNIFICANT CHANGE UP (ref 22–31)
CREAT SERPL-MCNC: 0.9 MG/DL — SIGNIFICANT CHANGE UP (ref 0.5–1.3)
CULTURE RESULTS: NO GROWTH — SIGNIFICANT CHANGE UP
GLUCOSE SERPL-MCNC: 88 MG/DL — SIGNIFICANT CHANGE UP (ref 70–99)
GRAM STN FLD: SIGNIFICANT CHANGE UP
HCT VFR BLD CALC: 32.5 % — LOW (ref 34.5–45)
HGB BLD-MCNC: 10.7 G/DL — LOW (ref 11.5–15.5)
INR BLD: 1.26 — HIGH (ref 0.88–1.16)
MAGNESIUM SERPL-MCNC: 2.1 MG/DL — SIGNIFICANT CHANGE UP (ref 1.6–2.6)
MCHC RBC-ENTMCNC: 27.8 PG — SIGNIFICANT CHANGE UP (ref 27–34)
MCHC RBC-ENTMCNC: 32.9 G/DL — SIGNIFICANT CHANGE UP (ref 32–36)
MCV RBC AUTO: 84.4 FL — SIGNIFICANT CHANGE UP (ref 80–100)
PHOSPHATE SERPL-MCNC: 4.5 MG/DL — SIGNIFICANT CHANGE UP (ref 2.5–4.5)
PLATELET # BLD AUTO: 300 K/UL — SIGNIFICANT CHANGE UP (ref 150–400)
POTASSIUM SERPL-MCNC: 4 MMOL/L — SIGNIFICANT CHANGE UP (ref 3.5–5.3)
POTASSIUM SERPL-SCNC: 4 MMOL/L — SIGNIFICANT CHANGE UP (ref 3.5–5.3)
PROTHROM AB SERPL-ACNC: 14 SEC — HIGH (ref 9.8–12.7)
RBC # BLD: 3.85 M/UL — SIGNIFICANT CHANGE UP (ref 3.8–5.2)
RBC # FLD: 15.1 % — SIGNIFICANT CHANGE UP (ref 10.3–16.9)
SODIUM SERPL-SCNC: 141 MMOL/L — SIGNIFICANT CHANGE UP (ref 135–145)
SPECIMEN SOURCE: SIGNIFICANT CHANGE UP
WBC # BLD: 10 K/UL — SIGNIFICANT CHANGE UP (ref 3.8–10.5)
WBC # FLD AUTO: 10 K/UL — SIGNIFICANT CHANGE UP (ref 3.8–10.5)

## 2017-08-18 PROCEDURE — 99233 SBSQ HOSP IP/OBS HIGH 50: CPT

## 2017-08-18 PROCEDURE — 71010: CPT | Mod: 26

## 2017-08-18 RX ADMIN — PIPERACILLIN AND TAZOBACTAM 200 GRAM(S): 4; .5 INJECTION, POWDER, LYOPHILIZED, FOR SOLUTION INTRAVENOUS at 11:26

## 2017-08-18 RX ADMIN — Medication 650 MILLIGRAM(S): at 18:08

## 2017-08-18 RX ADMIN — Medication 650 MILLIGRAM(S): at 06:23

## 2017-08-18 RX ADMIN — Medication 200 MILLIGRAM(S): at 06:32

## 2017-08-18 RX ADMIN — Medication 75 MICROGRAM(S): at 06:22

## 2017-08-18 RX ADMIN — SODIUM CHLORIDE 80 MILLILITER(S): 9 INJECTION INTRAMUSCULAR; INTRAVENOUS; SUBCUTANEOUS at 00:28

## 2017-08-18 RX ADMIN — PIPERACILLIN AND TAZOBACTAM 200 GRAM(S): 4; .5 INJECTION, POWDER, LYOPHILIZED, FOR SOLUTION INTRAVENOUS at 23:16

## 2017-08-18 RX ADMIN — PIPERACILLIN AND TAZOBACTAM 200 GRAM(S): 4; .5 INJECTION, POWDER, LYOPHILIZED, FOR SOLUTION INTRAVENOUS at 06:31

## 2017-08-18 RX ADMIN — PIPERACILLIN AND TAZOBACTAM 200 GRAM(S): 4; .5 INJECTION, POWDER, LYOPHILIZED, FOR SOLUTION INTRAVENOUS at 17:51

## 2017-08-18 RX ADMIN — Medication 200 MILLIGRAM(S): at 21:20

## 2017-08-18 RX ADMIN — PIPERACILLIN AND TAZOBACTAM 200 GRAM(S): 4; .5 INJECTION, POWDER, LYOPHILIZED, FOR SOLUTION INTRAVENOUS at 00:28

## 2017-08-18 NOTE — PROGRESS NOTE ADULT - PROBLEM SELECTOR PLAN 5
- IVF NS 80cc/hr, PO intake okay per pt. Will consider holding fluids if patient has signs of overload or pleural effusions worsen  - Replete electrolytes PRN  - Regular diet  - No SQH (IMPROVE score 0)  - Dispo RMF    Code FULL - INR 1.47 upon admission. Likely due to sepsis    - Unlikely vitamin K deficiency, patient reports eating one salad per day  - No family history or personal history of easy bleeding/bruising  - Downtrended to 1.26 this morning. Will monitor and can follow up outpatient

## 2017-08-18 NOTE — PROGRESS NOTE ADULT - ASSESSMENT
37F with PMH hypothyroidism presented with 1 week fever and back pain, found to have leukocytosis and evidence of likely bilateral pyelonephritis on CTAP. Patient having intermittent fevers to 39, antibiotic coverage expanded from ceftriaxone IV to Zosyn last night. Blood culture growing GNR in aerobic bottle.  Found to have new pleural effusions b/l on CXR last night.

## 2017-08-18 NOTE — PROGRESS NOTE ADULT - PROBLEM SELECTOR PLAN 2
- No symptoms at present, continue home dose Synthroid 75mcg qd due to pyelonephritis  clinically improved  abx as above

## 2017-08-18 NOTE — PROGRESS NOTE ADULT - PROBLEM SELECTOR PLAN 6
- 3.2 on admission, 2.9 yesterday morning  - Likely dilutional due to 2.5L fluid bolus in combination with decreased PO intake for past week, will monitor

## 2017-08-18 NOTE — PROGRESS NOTE ADULT - PROBLEM SELECTOR PLAN 7
- IVF NS 80cc/hr, PO intake okay per pt. Will consider holding fluids if patient has signs of overload or pleural effusions worsen  - Replete electrolytes PRN  - Regular diet  - No SQH (IMPROVE score 0) , SCDs while in bed. pt is ambulatory.   - Dispo RMF    Code FULL

## 2017-08-18 NOTE — PROGRESS NOTE ADULT - PROBLEM SELECTOR PLAN 4
- 3.2 on admission, 2.9 yesterday morning  - Likely dilutional due to 2.5L fluid bolus in combination with decreased PO intake for past week, will monitor - No symptoms at present, continue home dose Synthroid 75mcg qd

## 2017-08-18 NOTE — PROGRESS NOTE ADULT - PROBLEM SELECTOR PLAN 1
- Sepsis likely pyelonephritis due to CTAP findings wedge shaped hypodensities, bilateral kidneys  - WBC 17.3 on admission, downtrended to 10.0 this morning. Continue to trend CBC  - Continue IV Zosyn 3.375g q6h, day 2  - Lactate drawn last night during febrile episode, 0.7  - UA positive, however, squamous epithelial cells present. Culture sent yesterday  - Febrile to 38.9 last night, Tylenol PRN. Afebrile this AM  - F/u blood culture for organism and sensitivities  - Dyspnea resolved with fever last night, AM CXR obtained to evaluate fluid status and pleural effusions - Sepsis likely pyelonephritis due to CTAP findings wedge shaped hypodensities, bilateral kidneys  - WBC 17.3 on admission, downtrended to 10.0 this morning. Continue to trend CBC  - Continue IV Zosyn 3.375g q6h, day 2  - Lactate drawn last night during febrile episode, 0.7  - UA positive, however, squamous epithelial cells present. Culture sent yesterday  - Febrile to 38.9 last night, Tylenol PRN. Afebrile this AM  - F/u blood culture for organism and sensitivities

## 2017-08-18 NOTE — PROGRESS NOTE ADULT - SUBJECTIVE AND OBJECTIVE BOX
OVERNIGHT EVENTS: Patient experienced febrile episode yesterday evening to 38.9, other VSS. CXR showed new pleural effusion b/l, switched abx to Zosyn    SUBJECTIVE / INTERVAL HPI: Patient seen and examined at bedside. Complains of generalized headache this morning, R back pain near CVA. Denies fever, nausea, vomiting, chest pain, shortness of breath, abdominal pain, dysuria, diarrhea. Patient has not made a bowel movement in 3 days.     VITAL SIGNS:  Vital Signs Last 24 Hrs  T(C): 36.8 (18 Aug 2017 05:04), Max: 38.9 (17 Aug 2017 19:59)  T(F): 98.3 (18 Aug 2017 05:04), Max: 102.1 (17 Aug 2017 19:59)  HR: 73 (18 Aug 2017 05:04) (73 - 93)  BP: 110/67 (18 Aug 2017 05:04) (104/66 - 113/72)  BP(mean): --  RR: 16 (18 Aug 2017 05:04) (16 - 17)  SpO2: 96% (18 Aug 2017 05:04) (95% - 98%)    PHYSICAL EXAM:    General: WDWN, without diaphoresis  HEENT: NC/AT; anicteric sclera; MMM  Neck: supple  Cardiovascular: +S1/S2; RRR  Respiratory: CTA B/L; no W/R/R  Gastrointestinal: soft, NT/ND; +BSx4  Extremities: WWP; no edema, clubbing or cyanosis  Vascular: 2+ radial, DP/PT pulses B/L  Neurological: AAOx3; no focal deficits    MEDICATIONS:  MEDICATIONS  (STANDING):  levothyroxine 75 MICROGram(s) Oral daily  sodium chloride 0.9%. 1000 milliLiter(s) (80 mL/Hr) IV Continuous <Continuous>  piperacillin/tazobactam IVPB. 3.375 Gram(s) IV Intermittent every 6 hours    MEDICATIONS  (PRN):  acetaminophen   Tablet 650 milliGRAM(s) Oral every 6 hours PRN For Temp greater than 38 C (100.4 F)  acetaminophen   Tablet. 650 milliGRAM(s) Oral every 6 hours PRN Moderate Pain (4 - 6)  guaiFENesin    Syrup 200 milliGRAM(s) Oral every 6 hours PRN Cough      ALLERGIES:  Allergies    No Known Allergies    Intolerances        LABS:                        10.7   10.0  )-----------( 300      ( 18 Aug 2017 06:27 )             32.5     08-18    141  |  105  |  8   ----------------------------<  88  4.0   |  24  |  0.90    Ca    8.6      18 Aug 2017 06:27  Phos  4.5     08-18  Mg     2.1     08-18    TPro  6.5  /  Alb  2.9<L>  /  TBili  0.2  /  DBili  x   /  AST  17  /  ALT  14  /  AlkPhos  79  08-17    PT/INR - ( 18 Aug 2017 06:27 )   PT: 14.0 sec;   INR: 1.26          PTT - ( 18 Aug 2017 06:27 )  PTT:29.8 sec  Urinalysis Basic - ( 17 Aug 2017 16:18 )    Color: x / Appearance: x / SG: x / pH: x  Gluc: x / Ketone: x  / Bili: x / Urobili: x   Blood: x / Protein: x / Nitrite: x   Leuk Esterase: x / RBC: > 10 /HPF / WBC > 10 /HPF   Sq Epi: x / Non Sq Epi: Moderate /HPF / Bacteria: Present /HPF      CAPILLARY BLOOD GLUCOSE          RADIOLOGY & ADDITIONAL TESTS: Reviewed. OVERNIGHT EVENTS: Patient experienced febrile episode yesterday evening to 38.9, other VSS. CXR showed new pleural effusion b/l, switched abx to Zosyn    SUBJECTIVE / INTERVAL HPI: Patient seen and examined at bedside. Complains of generalized headache this morning, R back pain near CVA. Denies fever, nausea, vomiting, chest pain, shortness of breath, abdominal pain, dysuria, diarrhea. Patient has not made a bowel movement in 3 days.     VITAL SIGNS:  Vital Signs Last 24 Hrs  T(C): 36.8 (18 Aug 2017 05:04), Max: 38.9 (17 Aug 2017 19:59)  T(F): 98.3 (18 Aug 2017 05:04), Max: 102.1 (17 Aug 2017 19:59)  HR: 73 (18 Aug 2017 05:04) (73 - 93)  BP: 110/67 (18 Aug 2017 05:04) (104/66 - 113/72)  BP(mean): --  RR: 16 (18 Aug 2017 05:04) (16 - 17)  SpO2: 96% (18 Aug 2017 05:04) (95% - 98%)    PHYSICAL EXAM:    General: WDWN, without diaphoresis  HEENT: NC/AT; anicteric sclera; MMM  Neck: supple  Cardiovascular: +S1/S2; RRR, no M/R/G appreciated  Respiratory: CTA B/L with decreased breath sounds b/l bases; no W/R/R  Gastrointestinal: soft, mild distention, nontender to palpation though patient reports pressure; +BSx4  Musculoskeletal: + CVA tenderness R  Extremities: WWP; no edema, clubbing or cyanosis  Neurological: AAOx3; no focal deficits appreciated on exam    MEDICATIONS:  MEDICATIONS  (STANDING):  levothyroxine 75 MICROGram(s) Oral daily  sodium chloride 0.9%. 1000 milliLiter(s) (80 mL/Hr) IV Continuous <Continuous>  piperacillin/tazobactam IVPB. 3.375 Gram(s) IV Intermittent every 6 hours    MEDICATIONS  (PRN):  acetaminophen   Tablet 650 milliGRAM(s) Oral every 6 hours PRN For Temp greater than 38 C (100.4 F)  acetaminophen   Tablet. 650 milliGRAM(s) Oral every 6 hours PRN Moderate Pain (4 - 6)  guaiFENesin    Syrup 200 milliGRAM(s) Oral every 6 hours PRN Cough      ALLERGIES:  Allergies    No Known Allergies    Intolerances        LABS:                        10.7   10.0  )-----------( 300      ( 18 Aug 2017 06:27 )             32.5     08-18    141  |  105  |  8   ----------------------------<  88  4.0   |  24  |  0.90    Ca    8.6      18 Aug 2017 06:27  Phos  4.5     08-18  Mg     2.1     08-18    TPro  6.5  /  Alb  2.9<L>  /  TBili  0.2  /  DBili  x   /  AST  17  /  ALT  14  /  AlkPhos  79  08-17    PT/INR - ( 18 Aug 2017 06:27 )   PT: 14.0 sec;   INR: 1.26          PTT - ( 18 Aug 2017 06:27 )  PTT:29.8 sec  Urinalysis Basic - ( 17 Aug 2017 16:18 )    Color: x / Appearance: x / SG: x / pH: x  Gluc: x / Ketone: x  / Bili: x / Urobili: x   Blood: x / Protein: x / Nitrite: x   Leuk Esterase: x / RBC: > 10 /HPF / WBC > 10 /HPF   Sq Epi: x / Non Sq Epi: Moderate /HPF / Bacteria: Present /HPF      CAPILLARY BLOOD GLUCOSE          RADIOLOGY & ADDITIONAL TESTS: Reviewed. OVERNIGHT EVENTS: Patient experienced febrile episode yesterday evening to 38.9, other VSS. CXR showed new pleural effusion b/l, switched abx to Zosyn    SUBJECTIVE / INTERVAL HPI: Patient seen and examined at bedside. Complains of generalized headache this morning, back pain improved. feeling better overall.    ROS   Denies fever, nausea, vomiting, chest pain, shortness of breath, abdominal pain, dysuria, diarrhea. Patient has not made a bowel movement in 3 days.     VITAL SIGNS:  Vital Signs Last 24 Hrs  T(C): 36.8 (18 Aug 2017 05:04), Max: 38.9 (17 Aug 2017 19:59)  T(F): 98.3 (18 Aug 2017 05:04), Max: 102.1 (17 Aug 2017 19:59)  HR: 73 (18 Aug 2017 05:04) (73 - 93)  BP: 110/67 (18 Aug 2017 05:04) (104/66 - 113/72)  BP(mean): --  RR: 16 (18 Aug 2017 05:04) (16 - 17)  SpO2: 96% (18 Aug 2017 05:04) (95% - 98%)    PHYSICAL EXAM:    General: WDWN, without diaphoresis  HEENT: NC/AT; anicteric sclera; MMM  Neck: supple  Cardiovascular: +S1/S2; RRR, no M/R/G appreciated  Respiratory: CTA B/L with decreased breath sounds b/l bases; no W/R/R  Gastrointestinal: soft, non distention, nontender to palpation though patient reports pressure; +BSx4  Musculoskeletal: + CVA tenderness R and L (improved)  Extremities: WWP; no edema, clubbing or cyanosis  Neurological: AAOx3; 5/5 str all 4 ext, sens intact bl,  no focal deficits appreciated on exam    MEDICATIONS:  MEDICATIONS  (STANDING):  levothyroxine 75 MICROGram(s) Oral daily  sodium chloride 0.9%. 1000 milliLiter(s) (80 mL/Hr) IV Continuous <Continuous>  piperacillin/tazobactam IVPB. 3.375 Gram(s) IV Intermittent every 6 hours    MEDICATIONS  (PRN):  acetaminophen   Tablet 650 milliGRAM(s) Oral every 6 hours PRN For Temp greater than 38 C (100.4 F)  acetaminophen   Tablet. 650 milliGRAM(s) Oral every 6 hours PRN Moderate Pain (4 - 6)  guaiFENesin    Syrup 200 milliGRAM(s) Oral every 6 hours PRN Cough      ALLERGIES:  Allergies    No Known Allergies    Intolerances        LABS:                        10.7   10.0  )-----------( 300      ( 18 Aug 2017 06:27 )             32.5     08-18    141  |  105  |  8   ----------------------------<  88  4.0   |  24  |  0.90    Ca    8.6      18 Aug 2017 06:27  Phos  4.5     08-18  Mg     2.1     08-18    TPro  6.5  /  Alb  2.9<L>  /  TBili  0.2  /  DBili  x   /  AST  17  /  ALT  14  /  AlkPhos  79  08-17    PT/INR - ( 18 Aug 2017 06:27 )   PT: 14.0 sec;   INR: 1.26          PTT - ( 18 Aug 2017 06:27 )  PTT:29.8 sec  Urinalysis Basic - ( 17 Aug 2017 16:18 )    Color: x / Appearance: x / SG: x / pH: x  Gluc: x / Ketone: x  / Bili: x / Urobili: x   Blood: x / Protein: x / Nitrite: x   Leuk Esterase: x / RBC: > 10 /HPF / WBC > 10 /HPF   Sq Epi: x / Non Sq Epi: Moderate /HPF / Bacteria: Present /HPF      CAPILLARY BLOOD GLUCOSE          RADIOLOGY & ADDITIONAL TESTS: Reviewed.

## 2017-08-18 NOTE — PROGRESS NOTE ADULT - PROBLEM SELECTOR PLAN 3
- INR 1.47 upon admission. Likely due to sepsis vs previous URI  - Unlikely vitamin K deficiency, patient reports eating one salad per day  - No family history or personal history of easy bleeding/bruising  - Downtrended to 1.26 this morning. Will monitor and can follow up outpatient suspect due to sepsis and IVFs  stable  monitor o2 sats

## 2017-08-19 VITALS
RESPIRATION RATE: 17 BRPM | SYSTOLIC BLOOD PRESSURE: 112 MMHG | TEMPERATURE: 98 F | HEART RATE: 72 BPM | OXYGEN SATURATION: 98 % | DIASTOLIC BLOOD PRESSURE: 76 MMHG

## 2017-08-19 LAB
-  AMIKACIN: SIGNIFICANT CHANGE UP
-  AMPICILLIN/SULBACTAM: SIGNIFICANT CHANGE UP
-  AMPICILLIN: SIGNIFICANT CHANGE UP
-  AZTREONAM: SIGNIFICANT CHANGE UP
-  CEFAZOLIN: SIGNIFICANT CHANGE UP
-  CEFEPIME: SIGNIFICANT CHANGE UP
-  CEFOXITIN: SIGNIFICANT CHANGE UP
-  CEFTAZIDIME: SIGNIFICANT CHANGE UP
-  CEFTRIAXONE: SIGNIFICANT CHANGE UP
-  CIPROFLOXACIN: SIGNIFICANT CHANGE UP
-  ERTAPENEM: SIGNIFICANT CHANGE UP
-  GENTAMICIN: SIGNIFICANT CHANGE UP
-  IMIPENEM: SIGNIFICANT CHANGE UP
-  LEVOFLOXACIN: SIGNIFICANT CHANGE UP
-  MEROPENEM: SIGNIFICANT CHANGE UP
-  PIPERACILLIN/TAZOBACTAM: SIGNIFICANT CHANGE UP
-  TOBRAMYCIN: SIGNIFICANT CHANGE UP
-  TRIMETHOPRIM/SULFAMETHOXAZOLE: SIGNIFICANT CHANGE UP
ANION GAP SERPL CALC-SCNC: 13 MMOL/L — SIGNIFICANT CHANGE UP (ref 5–17)
BUN SERPL-MCNC: 7 MG/DL — SIGNIFICANT CHANGE UP (ref 7–23)
CALCIUM SERPL-MCNC: 8.9 MG/DL — SIGNIFICANT CHANGE UP (ref 8.4–10.5)
CHLORIDE SERPL-SCNC: 97 MMOL/L — SIGNIFICANT CHANGE UP (ref 96–108)
CO2 SERPL-SCNC: 25 MMOL/L — SIGNIFICANT CHANGE UP (ref 22–31)
CREAT SERPL-MCNC: 0.8 MG/DL — SIGNIFICANT CHANGE UP (ref 0.5–1.3)
CULTURE RESULTS: SIGNIFICANT CHANGE UP
GLUCOSE SERPL-MCNC: 148 MG/DL — HIGH (ref 70–99)
HCT VFR BLD CALC: 32.2 % — LOW (ref 34.5–45)
HGB BLD-MCNC: 10.6 G/DL — LOW (ref 11.5–15.5)
MAGNESIUM SERPL-MCNC: 2.1 MG/DL — SIGNIFICANT CHANGE UP (ref 1.6–2.6)
MCHC RBC-ENTMCNC: 27.7 PG — SIGNIFICANT CHANGE UP (ref 27–34)
MCHC RBC-ENTMCNC: 32.9 G/DL — SIGNIFICANT CHANGE UP (ref 32–36)
MCV RBC AUTO: 84.1 FL — SIGNIFICANT CHANGE UP (ref 80–100)
METHOD TYPE: SIGNIFICANT CHANGE UP
ORGANISM # SPEC MICROSCOPIC CNT: SIGNIFICANT CHANGE UP
PHOSPHATE SERPL-MCNC: 4.3 MG/DL — SIGNIFICANT CHANGE UP (ref 2.5–4.5)
PLATELET # BLD AUTO: 380 K/UL — SIGNIFICANT CHANGE UP (ref 150–400)
POTASSIUM SERPL-MCNC: 3.9 MMOL/L — SIGNIFICANT CHANGE UP (ref 3.5–5.3)
POTASSIUM SERPL-SCNC: 3.9 MMOL/L — SIGNIFICANT CHANGE UP (ref 3.5–5.3)
RBC # BLD: 3.83 M/UL — SIGNIFICANT CHANGE UP (ref 3.8–5.2)
RBC # FLD: 14.6 % — SIGNIFICANT CHANGE UP (ref 10.3–16.9)
SODIUM SERPL-SCNC: 135 MMOL/L — SIGNIFICANT CHANGE UP (ref 135–145)
SPECIMEN SOURCE: SIGNIFICANT CHANGE UP
WBC # BLD: 9.5 K/UL — SIGNIFICANT CHANGE UP (ref 3.8–10.5)
WBC # FLD AUTO: 9.5 K/UL — SIGNIFICANT CHANGE UP (ref 3.8–10.5)

## 2017-08-19 PROCEDURE — 82803 BLOOD GASES ANY COMBINATION: CPT

## 2017-08-19 PROCEDURE — 87400 INFLUENZA A/B EACH AG IA: CPT

## 2017-08-19 PROCEDURE — 36415 COLL VENOUS BLD VENIPUNCTURE: CPT

## 2017-08-19 PROCEDURE — 80053 COMPREHEN METABOLIC PANEL: CPT

## 2017-08-19 PROCEDURE — 87150 DNA/RNA AMPLIFIED PROBE: CPT

## 2017-08-19 PROCEDURE — 74177 CT ABD & PELVIS W/CONTRAST: CPT

## 2017-08-19 PROCEDURE — 85730 THROMBOPLASTIN TIME PARTIAL: CPT

## 2017-08-19 PROCEDURE — 83036 HEMOGLOBIN GLYCOSYLATED A1C: CPT

## 2017-08-19 PROCEDURE — 84100 ASSAY OF PHOSPHORUS: CPT

## 2017-08-19 PROCEDURE — 85610 PROTHROMBIN TIME: CPT

## 2017-08-19 PROCEDURE — 96374 THER/PROPH/DIAG INJ IV PUSH: CPT

## 2017-08-19 PROCEDURE — 87086 URINE CULTURE/COLONY COUNT: CPT

## 2017-08-19 PROCEDURE — 83605 ASSAY OF LACTIC ACID: CPT

## 2017-08-19 PROCEDURE — 87040 BLOOD CULTURE FOR BACTERIA: CPT

## 2017-08-19 PROCEDURE — 87880 STREP A ASSAY W/OPTIC: CPT

## 2017-08-19 PROCEDURE — 83735 ASSAY OF MAGNESIUM: CPT

## 2017-08-19 PROCEDURE — 99285 EMERGENCY DEPT VISIT HI MDM: CPT | Mod: 25

## 2017-08-19 PROCEDURE — 99239 HOSP IP/OBS DSCHRG MGMT >30: CPT

## 2017-08-19 PROCEDURE — 80048 BASIC METABOLIC PNL TOTAL CA: CPT

## 2017-08-19 PROCEDURE — 87486 CHLMYD PNEUM DNA AMP PROBE: CPT

## 2017-08-19 PROCEDURE — 87633 RESP VIRUS 12-25 TARGETS: CPT

## 2017-08-19 PROCEDURE — 85027 COMPLETE CBC AUTOMATED: CPT

## 2017-08-19 PROCEDURE — 96375 TX/PRO/DX INJ NEW DRUG ADDON: CPT

## 2017-08-19 PROCEDURE — 81001 URINALYSIS AUTO W/SCOPE: CPT

## 2017-08-19 PROCEDURE — 81025 URINE PREGNANCY TEST: CPT

## 2017-08-19 PROCEDURE — 71045 X-RAY EXAM CHEST 1 VIEW: CPT

## 2017-08-19 PROCEDURE — 71046 X-RAY EXAM CHEST 2 VIEWS: CPT

## 2017-08-19 PROCEDURE — 93005 ELECTROCARDIOGRAM TRACING: CPT

## 2017-08-19 PROCEDURE — 87581 M.PNEUMON DNA AMP PROBE: CPT

## 2017-08-19 PROCEDURE — 87186 SC STD MICRODIL/AGAR DIL: CPT

## 2017-08-19 PROCEDURE — 85025 COMPLETE CBC W/AUTO DIFF WBC: CPT

## 2017-08-19 PROCEDURE — 87798 DETECT AGENT NOS DNA AMP: CPT

## 2017-08-19 RX ORDER — CIPROFLOXACIN LACTATE 400MG/40ML
1 VIAL (ML) INTRAVENOUS
Qty: 11 | Refills: 0 | OUTPATIENT
Start: 2017-08-19 | End: 2017-08-30

## 2017-08-19 RX ORDER — CIPROFLOXACIN LACTATE 400MG/40ML
1 VIAL (ML) INTRAVENOUS
Qty: 22 | Refills: 0 | OUTPATIENT
Start: 2017-08-19 | End: 2017-08-30

## 2017-08-19 RX ORDER — CIPROFLOXACIN LACTATE 400MG/40ML
500 VIAL (ML) INTRAVENOUS EVERY 12 HOURS
Qty: 0 | Refills: 0 | Status: DISCONTINUED | OUTPATIENT
Start: 2017-08-19 | End: 2017-08-19

## 2017-08-19 RX ADMIN — Medication 75 MICROGRAM(S): at 05:58

## 2017-08-19 RX ADMIN — PIPERACILLIN AND TAZOBACTAM 200 GRAM(S): 4; .5 INJECTION, POWDER, LYOPHILIZED, FOR SOLUTION INTRAVENOUS at 05:58

## 2017-08-22 DIAGNOSIS — N12 TUBULO-INTERSTITIAL NEPHRITIS, NOT SPECIFIED AS ACUTE OR CHRONIC: ICD-10-CM

## 2017-08-22 DIAGNOSIS — J90 PLEURAL EFFUSION, NOT ELSEWHERE CLASSIFIED: ICD-10-CM

## 2017-08-22 DIAGNOSIS — R50.9 FEVER, UNSPECIFIED: ICD-10-CM

## 2017-08-22 DIAGNOSIS — E03.9 HYPOTHYROIDISM, UNSPECIFIED: ICD-10-CM

## 2017-08-22 DIAGNOSIS — A41.51 SEPSIS DUE TO ESCHERICHIA COLI [E. COLI]: ICD-10-CM

## 2017-08-22 DIAGNOSIS — E88.09 OTHER DISORDERS OF PLASMA-PROTEIN METABOLISM, NOT ELSEWHERE CLASSIFIED: ICD-10-CM

## 2017-08-22 LAB
CULTURE RESULTS: SIGNIFICANT CHANGE UP
SPECIMEN SOURCE: SIGNIFICANT CHANGE UP

## 2022-08-19 NOTE — PATIENT PROFILE ADULT. - HEALTH/HEALTHCARE ANXIETIES, PROFILE
Physical Therapy  Patient not seen in therapy.     Unavailable due to feeling too ill.      Pt declines therapy due to continued nausea and fatigue. RN aware. Will follow up per POC.        OBJECTIVE                     Therapy procedure time and total treatment time can be found documented on the Time Entry flowsheet   none 18-Jul-2019